# Patient Record
Sex: MALE | Race: WHITE | NOT HISPANIC OR LATINO | URBAN - METROPOLITAN AREA
[De-identification: names, ages, dates, MRNs, and addresses within clinical notes are randomized per-mention and may not be internally consistent; named-entity substitution may affect disease eponyms.]

---

## 2019-08-22 ENCOUNTER — OFFICE VISIT (OUTPATIENT)
Dept: URBAN - METROPOLITAN AREA CLINIC 18 | Facility: CLINIC | Age: 74
End: 2019-08-22
Payer: MEDICARE

## 2019-08-22 DIAGNOSIS — E11.9 TYPE 2 DIABETES MELLITUS W/O COMPLICATION: Primary | Chronic | ICD-10-CM

## 2019-08-22 DIAGNOSIS — H04.123 DRY EYE SYNDROME OF BILATERAL LACRIMAL GLANDS: Chronic | ICD-10-CM

## 2019-08-22 DIAGNOSIS — H25.813 COMBINED FORMS OF AGE-RELATED CATARACT, BILATERAL: Chronic | ICD-10-CM

## 2019-08-22 PROCEDURE — 92014 COMPRE OPH EXAM EST PT 1/>: CPT | Performed by: OPTOMETRIST

## 2019-08-22 ASSESSMENT — INTRAOCULAR PRESSURE
OD: 14
OS: 15

## 2019-08-22 ASSESSMENT — VISUAL ACUITY
OS: 20/40
OD: 20/40

## 2019-08-22 ASSESSMENT — KERATOMETRY
OS: 45.00
OD: 45.25

## 2019-08-22 NOTE — IMPRESSION/PLAN
Impression: Combined forms of age-related cataract, bilateral: H25.813. Plan: Discussed diagnosis in detail with patient. Discussed treatment options with patient. Patient prefers no treatment at this time. Will continue to observe condition and or symptoms. Call if [place] worsens.

## 2019-08-22 NOTE — IMPRESSION/PLAN
Impression: Type 2 diabetes mellitus w/o complication: S79.3. Plan: Diabetes type II: no background retinopathy, no signs of neovascularization noted. Discussed ocular and systemic benefits of blood sugar control.

## 2020-01-01 ENCOUNTER — SURGERY (OUTPATIENT)
Dept: URBAN - METROPOLITAN AREA SURGERY 7 | Facility: SURGERY | Age: 75
End: 2020-01-01
Payer: MEDICARE

## 2020-01-01 ENCOUNTER — OFFICE VISIT (OUTPATIENT)
Dept: URBAN - METROPOLITAN AREA CLINIC 18 | Facility: CLINIC | Age: 75
End: 2020-01-01
Payer: MEDICARE

## 2020-01-01 ENCOUNTER — OFFICE VISIT (OUTPATIENT)
Dept: URBAN - METROPOLITAN AREA CLINIC 17 | Facility: CLINIC | Age: 75
End: 2020-01-01
Payer: MEDICARE

## 2020-01-01 DIAGNOSIS — Z48.810 ENCOUNTER FOR SURGICAL AFTERCARE FOLLOWING SURGERY ON A SENSE ORGAN: Primary | ICD-10-CM

## 2020-01-01 DIAGNOSIS — Z96.1 PRESENCE OF INTRAOCULAR LENS: Primary | ICD-10-CM

## 2020-01-01 DIAGNOSIS — H25.13 AGE-RELATED NUCLEAR CATARACT, BILATERAL: Primary | ICD-10-CM

## 2020-01-01 DIAGNOSIS — E11.3293 TYPE 2 DIAB W MILD NONPRLF DIABETIC RTNOP W/O MACULAR EDEMA, BILATERAL: Primary | ICD-10-CM

## 2020-01-01 PROCEDURE — 92004 COMPRE OPH EXAM NEW PT 1/>: CPT | Performed by: OPHTHALMOLOGY

## 2020-01-01 PROCEDURE — 99024 POSTOP FOLLOW-UP VISIT: CPT | Performed by: OPTOMETRIST

## 2020-01-01 PROCEDURE — 66984 XCAPSL CTRC RMVL W/O ECP: CPT | Performed by: OPHTHALMOLOGY

## 2020-01-01 PROCEDURE — 92134 CPTRZ OPH DX IMG PST SGM RTA: CPT | Performed by: OPTOMETRIST

## 2020-01-01 PROCEDURE — 92014 COMPRE OPH EXAM EST PT 1/>: CPT | Performed by: OPTOMETRIST

## 2020-01-01 RX ORDER — KETOROLAC TROMETHAMINE 5 MG/ML
0.5 % SOLUTION OPHTHALMIC
Qty: 1 | Refills: 2 | Status: INACTIVE
Start: 2020-01-01 | End: 2020-01-01

## 2020-01-01 RX ORDER — PREDNISOLONE ACETATE 10 MG/ML
1 % SUSPENSION/ DROPS OPHTHALMIC
Qty: 10 | Refills: 1 | Status: INACTIVE
Start: 2020-01-01 | End: 2020-01-01

## 2020-01-01 RX ORDER — OFLOXACIN 3 MG/ML
0.3 % SOLUTION/ DROPS OPHTHALMIC
Qty: 5 | Refills: 1 | Status: INACTIVE
Start: 2020-01-01 | End: 2020-01-01

## 2020-01-01 ASSESSMENT — VISUAL ACUITY
OS: 20/25-1
OD: 20/40
OD: 20/30-1
OD: 20/30-1

## 2020-01-01 ASSESSMENT — INTRAOCULAR PRESSURE
OS: 23
OD: 20
OS: 21
OD: 21
OS: 15
OD: 21
OD: 12
OD: 19
OS: 15
OS: 14
OS: 21
OD: 23

## 2020-01-01 ASSESSMENT — PACHYMETRY
OS: 22.04
OD: 2.86
OD: 22.15
OS: 2.75

## 2020-01-01 ASSESSMENT — KERATOMETRY
OS: 45.38
OD: 45.25

## 2020-09-15 NOTE — IMPRESSION/PLAN
Impression: Combined forms of age-related cataract, bilateral: H25.813. Bilateral. Plan: Discussed cataract diagnosis with the patient. Discussed and reviewed treatment options for cataracts. Risks and benefits of surgical treatment were discussed and understood. Pt interested in surgical treatment. Refer for consult w/Dr. Ani Lakhani, next available.

## 2020-09-15 NOTE — IMPRESSION/PLAN
Impression: Type 2 diab w mild nonprlf diabetic rtnop w/o macular edema, bilateral: H98.0216. Bilateral.
Mild parafoveal edema OS>OD. Plan: Mild Non-Proliferative Diabetic Retinopathy, no Diabetic Macular Edema and no Neovascularization of the iris, disc, or elsewhere. OCT performed and reviewed today, no sign of diabetic macular edema OU. Discussed ocular and systemic benefits of blood sugar control. Send notes to PCP. Recommend consult w/retina specialist prior to Cataract eval.

## 2020-09-25 NOTE — IMPRESSION/PLAN
Impression: Age-related nuclear cataract, bilateral: H25.13. Condition: established, worsening. Symptoms: could improve with surgery. Plan: Cataract accounts for patient's complaints. Reviewed risks, benefits, and procedure. Patient desires surgery, schedule ce/iol OD then OS, RL2, standard lens, distance refractive target, patient is clear for surgery in [place] [id number]. Pt to use NSAID and Steroids (Or Combo drops) for 6-8 weeks after surgery.

## 2020-12-04 NOTE — IMPRESSION/PLAN
Impression: S/P CE/Standard IOL SA60WF +24.50 OD - 1 Day. Encounter for surgical aftercare following surgery on a sense organ  Z48.810. Post operative instructions reviewed - Plan: RTC in 1 week for PO2 OD:
--Continue Ofloxacin 0.3% QID x 1 week then d/c
--Taper Prednisolone acetate 1% QID x 5 wk, TID x 1 wk, BID x 1wk, QD x 1wk, then d/c
--Start Ketorolac QID x 8 weeks

## 2020-12-10 NOTE — IMPRESSION/PLAN
Impression: S/P CE/Standard IOL SA60WF +24.50 OD - 7 Days. Encounter for surgical aftercare following surgery on a sense organ  Z48.810.  Excellent post op course Plan: --Continue Ketorolac 0.5% QID OD
--Discontinue Ofloxacin 0.3% OD 
--Taper Prednisolone acetate 1% QID x 1wk, TID x 1wk, BID x 1wk, QD x 1wk

## 2020-12-18 NOTE — IMPRESSION/PLAN
Impression: S/P Cataract Extraction by phacoemulsification with IOL placement, [id number] OS - 1 Day. Encounter for surgical aftercare following surgery on a sense organ  Z48.810. Excellent post op course Plan: --Continue Prednisolone acetate 1% QID OS
-- Continue Ketorolac QID OS Ofloxacin n QID OS x 1 week then D/c

## 2020-12-28 NOTE — IMPRESSION/PLAN
Impression: S/P CE/Standard IOL SA60WF +25.00 OS - 11 Days. Presence of intraocular lens  Z96.1. Condition is improving - Plan: RTC in 3 weeks for PO3 OS:
--Taper Prednisolone acetate 1% QID x 4 weeks, TID x 1 wk, BID x 1wk, QD x 1wk, then d/c
--Continue Ketorolac 0.5% QID x 5-7 weeks OD:
--Taper Prednisolone acetate 1% QID x 1 wk, TID x 1 wk, BID x 1wk, QD x 1wk, then d/c
--Continue Ketorolac 0.5% QID x 4 weeks